# Patient Record
(demographics unavailable — no encounter records)

---

## 2024-10-09 NOTE — HISTORY OF PRESENT ILLNESS
[FreeTextEntry1] : Ms. LEIGH is a 47 year female presenting for evaluation of chest pain    Notable PMHx: - CAD - Arrhythmias - Valvular disease - HTN - HLD - DM2 - PAD - Stroke - Obesity - ILA - Smoking - Prior pregnancies   HPI: ***  *** Denies chest pain, palpitations, light-headedness/dizziness, syncope, orthopnea, PND, ankle swelling, calf claudication, cough, fever/chills, snoring, apneic episodes    Functional Status: ***   Social Hx: - tobacco use: - alcohol use: - recreational drug use: - occupation: - lives with:   Data Review:  EKG - ***  Echo - ***  Cath - ***  Cardiac CT - ***  Cardiac MRI - ***   ------------------------------ ***   # *** -   RTC in *** or sooner PRN

## 2024-12-02 NOTE — HISTORY OF PRESENT ILLNESS
[FreeTextEntry1] : She recently had an upper endoscopy which revealed normal mucosa throughout a small hiatal hernia.  Routine biopsies of his stomach were negative for Helicobacter pylori  and intestinal metaplasia.  She was  placed on pantoprazole 40 mg daily in the morning and dicyclomine 20 mg 3 times a day.  She is feeling much better on these medications and has no complaints.  She also has a history of gallstones but has no symptoms related to her gallstones at all   Her  was in the room

## 2024-12-02 NOTE — CONSULT LETTER
[Dear  ___] : Dear  [unfilled], [Consult Letter:] : I had the pleasure of evaluating your patient, [unfilled]. [( Thank you for referring [unfilled] for consultation for _____ )] : Thank you for referring [unfilled] for consultation for [unfilled] [Please see my note below.] : Please see my note below. [Consult Closing:] : Thank you very much for allowing me to participate in the care of this patient.  If you have any questions, please do not hesitate to contact me. [Sincerely,] : Sincerely, [FreeTextEntry3] : Tera Son MD  Gastroenterology [facility] [place]

## 2024-12-02 NOTE — ASSESSMENT
[FreeTextEntry1] : Continue pantoprazole and dicyclomine Will start to taper her next visit if she is feeling well  Office follow-up in 3 to 4 months  Also schedule colonoscopy at that time   I spent 25 minutes with the patient and her  and answered all of their questions